# Patient Record
Sex: FEMALE | Race: WHITE | NOT HISPANIC OR LATINO | Employment: OTHER | URBAN - METROPOLITAN AREA
[De-identification: names, ages, dates, MRNs, and addresses within clinical notes are randomized per-mention and may not be internally consistent; named-entity substitution may affect disease eponyms.]

---

## 2019-04-15 RX ORDER — HYDROXYZINE HYDROCHLORIDE 10 MG/1
TABLET, FILM COATED ORAL
Qty: 90 TABLET | Refills: 0 | Status: SHIPPED | OUTPATIENT
Start: 2019-04-15

## 2019-06-24 ENCOUNTER — OFFICE VISIT (OUTPATIENT)
Dept: FAMILY MEDICINE | Facility: CLINIC | Age: 74
End: 2019-06-24
Payer: MEDICARE

## 2019-06-24 VITALS
OXYGEN SATURATION: 95 % | DIASTOLIC BLOOD PRESSURE: 82 MMHG | BODY MASS INDEX: 20.41 KG/M2 | TEMPERATURE: 97 F | RESPIRATION RATE: 18 BRPM | SYSTOLIC BLOOD PRESSURE: 136 MMHG | HEART RATE: 77 BPM | HEIGHT: 66 IN | WEIGHT: 127 LBS

## 2019-06-24 DIAGNOSIS — N39.46 MIXED STRESS AND URGE URINARY INCONTINENCE: Primary | ICD-10-CM

## 2019-06-24 DIAGNOSIS — Z86.73 HISTORY OF EMBOLIC STROKE: ICD-10-CM

## 2019-06-24 DIAGNOSIS — J30.2 SEASONAL ALLERGIES: ICD-10-CM

## 2019-06-24 DIAGNOSIS — H92.02 OTALGIA, LEFT: ICD-10-CM

## 2019-06-24 DIAGNOSIS — R51.9 CHRONIC INTRACTABLE HEADACHE, UNSPECIFIED HEADACHE TYPE: ICD-10-CM

## 2019-06-24 DIAGNOSIS — Z76.0 MEDICATION REFILL: ICD-10-CM

## 2019-06-24 DIAGNOSIS — E78.00 PURE HYPERCHOLESTEROLEMIA: ICD-10-CM

## 2019-06-24 DIAGNOSIS — I10 ESSENTIAL (PRIMARY) HYPERTENSION: ICD-10-CM

## 2019-06-24 DIAGNOSIS — G89.29 CHRONIC INTRACTABLE HEADACHE, UNSPECIFIED HEADACHE TYPE: ICD-10-CM

## 2019-06-24 PROBLEM — F32.A DEPRESSIVE DISORDER: Status: ACTIVE | Noted: 2017-11-06

## 2019-06-24 PROBLEM — M50.30 DDD (DEGENERATIVE DISC DISEASE), CERVICAL: Status: ACTIVE | Noted: 2018-09-06

## 2019-06-24 PROBLEM — D64.9 ANEMIA: Status: ACTIVE | Noted: 2018-09-06

## 2019-06-24 PROBLEM — J30.9 ALLERGIC RHINITIS: Status: ACTIVE | Noted: 2017-11-06

## 2019-06-24 PROCEDURE — 3075F PR MOST RECENT SYSTOLIC BLOOD PRESS GE 130-139MM HG: ICD-10-PCS | Mod: CPTII,S$GLB,, | Performed by: NURSE PRACTITIONER

## 2019-06-24 PROCEDURE — 3079F PR MOST RECENT DIASTOLIC BLOOD PRESSURE 80-89 MM HG: ICD-10-PCS | Mod: CPTII,S$GLB,, | Performed by: NURSE PRACTITIONER

## 2019-06-24 PROCEDURE — 99214 OFFICE O/P EST MOD 30 MIN: CPT | Mod: S$GLB,,, | Performed by: NURSE PRACTITIONER

## 2019-06-24 PROCEDURE — 99214 PR OFFICE/OUTPT VISIT, EST, LEVL IV, 30-39 MIN: ICD-10-PCS | Mod: S$GLB,,, | Performed by: NURSE PRACTITIONER

## 2019-06-24 PROCEDURE — 3075F SYST BP GE 130 - 139MM HG: CPT | Mod: CPTII,S$GLB,, | Performed by: NURSE PRACTITIONER

## 2019-06-24 PROCEDURE — 3079F DIAST BP 80-89 MM HG: CPT | Mod: CPTII,S$GLB,, | Performed by: NURSE PRACTITIONER

## 2019-06-24 RX ORDER — FLUTICASONE PROPIONATE 50 MCG
1 SPRAY, SUSPENSION (ML) NASAL DAILY PRN
Qty: 3 BOTTLE | Refills: 3 | Status: SHIPPED | OUTPATIENT
Start: 2019-06-24

## 2019-06-24 RX ORDER — POTASSIUM CHLORIDE 1500 MG/1
20 TABLET, EXTENDED RELEASE ORAL DAILY
Qty: 90 TABLET | Refills: 3 | Status: SHIPPED | OUTPATIENT
Start: 2019-06-24

## 2019-06-24 RX ORDER — AMITRIPTYLINE HYDROCHLORIDE 25 MG/1
25 TABLET, FILM COATED ORAL DAILY
Qty: 90 TABLET | Refills: 3 | Status: SHIPPED | OUTPATIENT
Start: 2019-06-24

## 2019-06-24 RX ORDER — CLOPIDOGREL BISULFATE 75 MG/1
75 TABLET ORAL DAILY
Qty: 90 TABLET | Refills: 3 | Status: SHIPPED | OUTPATIENT
Start: 2019-06-24

## 2019-06-24 RX ORDER — MONTELUKAST SODIUM 10 MG/1
1 TABLET ORAL DAILY
COMMUNITY
End: 2019-06-24 | Stop reason: SDUPTHER

## 2019-06-24 RX ORDER — MONTELUKAST SODIUM 10 MG/1
10 TABLET ORAL NIGHTLY
Qty: 90 TABLET | Refills: 3 | Status: SHIPPED | OUTPATIENT
Start: 2019-06-24

## 2019-06-24 RX ORDER — PRAVASTATIN SODIUM 40 MG/1
1 TABLET ORAL DAILY
COMMUNITY
End: 2019-06-24 | Stop reason: SDUPTHER

## 2019-06-24 RX ORDER — FESOTERODINE FUMARATE 8 MG/1
1 TABLET, EXTENDED RELEASE ORAL DAILY
COMMUNITY
End: 2019-07-01 | Stop reason: ALTCHOICE

## 2019-06-24 RX ORDER — CARVEDILOL 12.5 MG/1
12.5 TABLET ORAL 2 TIMES DAILY
Qty: 180 TABLET | Refills: 3 | Status: SHIPPED | OUTPATIENT
Start: 2019-06-24 | End: 2019-09-04

## 2019-06-24 RX ORDER — ASPIRIN 81 MG/1
1 TABLET ORAL DAILY
COMMUNITY

## 2019-06-24 RX ORDER — LORATADINE 10 MG/1
1 TABLET ORAL DAILY
COMMUNITY
End: 2019-06-24 | Stop reason: SDUPTHER

## 2019-06-24 RX ORDER — LEVOTHYROXINE SODIUM 100 UG/1
100 TABLET ORAL DAILY
Qty: 90 TABLET | Refills: 3 | Status: SHIPPED | OUTPATIENT
Start: 2019-06-24

## 2019-06-24 RX ORDER — LEVOTHYROXINE SODIUM 100 UG/1
1 TABLET ORAL DAILY
COMMUNITY
End: 2019-06-24 | Stop reason: SDUPTHER

## 2019-06-24 RX ORDER — CLOPIDOGREL BISULFATE 75 MG/1
1 TABLET ORAL DAILY
COMMUNITY
End: 2019-06-24 | Stop reason: SDUPTHER

## 2019-06-24 RX ORDER — ESCITALOPRAM OXALATE 20 MG/1
0.5 TABLET ORAL DAILY
COMMUNITY
End: 2019-06-24 | Stop reason: SDUPTHER

## 2019-06-24 RX ORDER — ACETAMINOPHEN 500 MG
1 TABLET ORAL DAILY
COMMUNITY

## 2019-06-24 RX ORDER — AMLODIPINE BESYLATE 5 MG/1
5 TABLET ORAL DAILY
Qty: 90 TABLET | Refills: 3 | Status: SHIPPED | OUTPATIENT
Start: 2019-06-24 | End: 2019-09-04

## 2019-06-24 RX ORDER — PRAVASTATIN SODIUM 40 MG/1
40 TABLET ORAL DAILY
Qty: 90 TABLET | Refills: 3 | Status: SHIPPED | OUTPATIENT
Start: 2019-06-24

## 2019-06-24 RX ORDER — FERROUS SULFATE 325(65) MG
1 TABLET ORAL EVERY 8 HOURS
COMMUNITY

## 2019-06-24 RX ORDER — FLUTICASONE PROPIONATE 50 MCG
1 SPRAY, SUSPENSION (ML) NASAL DAILY PRN
COMMUNITY
End: 2019-06-24 | Stop reason: SDUPTHER

## 2019-06-24 RX ORDER — FUROSEMIDE 20 MG/1
1 TABLET ORAL DAILY PRN
COMMUNITY
End: 2019-06-24 | Stop reason: SDUPTHER

## 2019-06-24 RX ORDER — ESCITALOPRAM OXALATE 20 MG/1
10 TABLET ORAL DAILY
Qty: 90 TABLET | Refills: 3 | Status: SHIPPED | OUTPATIENT
Start: 2019-06-24

## 2019-06-24 RX ORDER — LORATADINE 10 MG/1
10 TABLET ORAL DAILY
Qty: 90 TABLET | Refills: 3 | COMMUNITY
Start: 2019-06-24

## 2019-06-24 RX ORDER — TIZANIDINE 4 MG/1
1 TABLET ORAL DAILY
COMMUNITY
End: 2019-06-24 | Stop reason: SDUPTHER

## 2019-06-24 RX ORDER — AMLODIPINE BESYLATE 5 MG/1
1 TABLET ORAL DAILY
COMMUNITY
End: 2019-06-24 | Stop reason: SDUPTHER

## 2019-06-24 RX ORDER — POTASSIUM CHLORIDE 1500 MG/1
1 TABLET, EXTENDED RELEASE ORAL DAILY
COMMUNITY
End: 2019-06-24 | Stop reason: SDUPTHER

## 2019-06-24 RX ORDER — CARVEDILOL 12.5 MG/1
1 TABLET ORAL 2 TIMES DAILY
COMMUNITY
End: 2019-06-24 | Stop reason: SDUPTHER

## 2019-06-24 RX ORDER — QUINAPRIL HYDROCHLORIDE AND HYDROCHLOROTHIAZIDE 20; 25 MG/1; MG/1
1 TABLET, FILM COATED ORAL DAILY
COMMUNITY
End: 2019-06-24 | Stop reason: SDUPTHER

## 2019-06-24 RX ORDER — FUROSEMIDE 20 MG/1
20 TABLET ORAL DAILY PRN
Qty: 90 TABLET | Refills: 3 | Status: SHIPPED | OUTPATIENT
Start: 2019-06-24

## 2019-06-24 RX ORDER — LORAZEPAM 0.5 MG/1
0.5 TABLET ORAL NIGHTLY
Qty: 30 TABLET | Refills: 2 | Status: SHIPPED | OUTPATIENT
Start: 2019-06-24

## 2019-06-24 RX ORDER — LORAZEPAM 0.5 MG/1
1 TABLET ORAL 2 TIMES DAILY PRN
COMMUNITY
End: 2019-06-24 | Stop reason: SDUPTHER

## 2019-06-24 RX ORDER — AMITRIPTYLINE HYDROCHLORIDE 25 MG/1
1 TABLET, FILM COATED ORAL DAILY
COMMUNITY
End: 2019-06-24 | Stop reason: SDUPTHER

## 2019-06-24 RX ORDER — QUINAPRIL HYDROCHLORIDE AND HYDROCHLOROTHIAZIDE 20; 25 MG/1; MG/1
1 TABLET, FILM COATED ORAL DAILY
Qty: 90 TABLET | Refills: 3 | Status: SHIPPED | OUTPATIENT
Start: 2019-06-24 | End: 2019-09-04

## 2019-06-24 RX ORDER — TIZANIDINE 4 MG/1
4 TABLET ORAL DAILY
Qty: 90 TABLET | Refills: 3 | Status: SHIPPED | OUTPATIENT
Start: 2019-06-24

## 2019-06-24 NOTE — PROGRESS NOTES
"Subjective:       Patient ID: Marian Glass is a 73 y.o. female.    Chief Complaint: Urinary Incontinence (Urology referral. ); Sinus Problem ("head stopped up", some congestion "feels like it stays full".); Otalgia (C/o having a very sharp pain in her L ear, and L ear stays "stopped up"); and Medication Refill (All of her scripts were in [place] when she was there, needs new scripts since she is back in [place]. )    HPI   Hx of recurrent migraines and carotid artery stenosis s/p endarterectomy. Emolic stroke related to endarterectomy. Migraines stable now. When h/a occurs, she describes throbbing in temples that wraps around forehead. Equal on both sides. Occasionally sharp w/ movement. Very sensitive to light, sound , odor. Also c/o "seeing spots" with onset of headaches. No hx of seizures. No unilateral weakness.    Otalgia (C/o having a very sharp pain in her L ear, and L ear stays "stopped up")    Anxiety/Depression  Reported by patient.    Quality: symptoms worse in the evening  Severity: denies suicidal ideations; able to maintain relationships; does not interfere with activities of daily living   Duration: worse after hospitalization in May for stroke   Onset/Timing: still present   Context: health problems   Modifying Factors: social support; medications as directed   Associated Symptoms: denies homicidal ideations; no significant weight gain; no significant weight loss; no visual/auditory hallucinations; no delusions; no shortness of breath; no anxiety; no crying spells; no panic; no isolation; sleeping well; appetite good; no apathy; maintaining functionality; depression; headaches    Hypertension & Follow Up HTN  Reported by patient.    Onset/Timing: worse; chronic  Associated Symptoms: no dizziness; no lightheadedness; no chest pain; no shortness of breath; no palpitations; no edema; no calf pain with exertion; no vision change; headache  Lifestyle: limiting/avoiding salt; not exercising " regularly  Medications: no side effects from medication; not taking medications as directed; did not take this am      Review of Systems   Constitutional: Negative for activity change, appetite change, chills and fever.   HENT: Negative for congestion, hearing loss, nosebleeds, postnasal drip, sinus pressure, sore throat and trouble swallowing.    Respiratory: Negative for cough, chest tightness, shortness of breath and wheezing.    Cardiovascular: Negative for chest pain and palpitations.   Gastrointestinal: Negative for abdominal distention, abdominal pain, constipation, diarrhea, nausea and vomiting.   Genitourinary: Negative for difficulty urinating, flank pain, frequency, hematuria and urgency.   Musculoskeletal: Negative for arthralgias, back pain, joint swelling, neck pain and neck stiffness.   Skin: Negative for color change and pallor.   Neurological: Negative for dizziness, seizures, syncope, weakness, light-headedness and headaches.   Hematological: Negative for adenopathy. Does not bruise/bleed easily.   Psychiatric/Behavioral: Negative for agitation, behavioral problems, confusion and sleep disturbance. The patient is not nervous/anxious.        Objective:      Physical Exam   Constitutional: She is oriented to person, place, and time. She appears well-developed and well-nourished.   HENT:   Head: Normocephalic and atraumatic.   Mouth/Throat: Oropharynx is clear and moist.   Eyes: Pupils are equal, round, and reactive to light. Conjunctivae and EOM are normal. No scleral icterus.   Neck: Trachea normal and normal range of motion. Neck supple. Carotid bruit is not present. No thyroid mass present.   Cardiovascular: Normal rate, regular rhythm and normal heart sounds.   Pulmonary/Chest: Effort normal and breath sounds normal.   Abdominal: Soft. Bowel sounds are normal.   Musculoskeletal: Normal range of motion. She exhibits no edema.   Neurological: She is alert and oriented to person, place, and time.    Skin: Skin is warm and dry.   Psychiatric: She has a normal mood and affect. Her behavior is normal. Judgment normal.       Assessment:       1. Mixed stress and urge urinary incontinence    2. Chronic intractable headache, unspecified headache type    3. Seasonal allergies    4. Essential (primary) hypertension    5. Pure hypercholesterolemia    6. Otalgia, left    7. Medication refill    8. History of embolic stroke        Plan:       PROBLEM LIST     Marian was seen today for urinary incontinence, sinus problem, otalgia and medication refill.    Diagnoses and all orders for this visit:    Mixed stress and urge urinary incontinence    Chronic intractable headache, unspecified headache type    Seasonal allergies  Start Toviaz as directed  Essential (primary) hypertension  -     TSH w/reflex to FT4; Future  -     CBC auto differential; Future  -     Comprehensive metabolic panel; Future  -     Lipid panel; Future  -     Hemoglobin A1c; Future  -     TSH w/reflex to FT4  -     CBC auto differential  -     Comprehensive metabolic panel  -     Lipid panel  -     Hemoglobin A1c    Pure hypercholesterolemia  -     TSH w/reflex to FT4; Future  -     CBC auto differential; Future  -     Comprehensive metabolic panel; Future  -     Lipid panel; Future  -     Hemoglobin A1c; Future  -     TSH w/reflex to FT4  -     CBC auto differential  -     Comprehensive metabolic panel  -     Lipid panel  -     Hemoglobin A1c    Otalgia, left    Medication refill  -     amitriptyline (ELAVIL) 25 MG tablet; Take 1 tablet (25 mg total) by mouth once daily.  -     amLODIPine (NORVASC) 5 MG tablet; Take 1 tablet (5 mg total) by mouth once daily.  -     carvedilol (COREG) 12.5 MG tablet; Take 1 tablet (12.5 mg total) by mouth 2 (two) times daily.  -     clopidogrel (PLAVIX) 75 mg tablet; Take 1 tablet (75 mg total) by mouth once daily.  -     escitalopram oxalate (LEXAPRO) 20 MG tablet; Take 0.5 tablets (10 mg total) by mouth once daily.  -      fluticasone propionate (FLONASE ALLERGY RELIEF) 50 mcg/actuation nasal spray; 1 spray (50 mcg total) by Each Nare route daily as needed.  -     furosemide (LASIX) 20 MG tablet; Take 1 tablet (20 mg total) by mouth daily as needed.  -     tiZANidine (ZANAFLEX) 4 MG tablet; Take 1 tablet (4 mg total) by mouth once daily.  -     levothyroxine (SYNTHROID) 100 MCG tablet; Take 1 tablet (100 mcg total) by mouth once daily.  -     montelukast (SINGULAIR) 10 mg tablet; Take 1 tablet (10 mg total) by mouth every evening.  -     potassium chloride (K-TAB) 20 mEq; Take 1 tablet (20 mEq total) by mouth once daily.  -     pravastatin (PRAVACHOL) 40 MG tablet; Take 1 tablet (40 mg total) by mouth once daily.  -     quinapril-hydrochlorothiazide (ACCURETIC) 20-25 mg per tablet; Take 1 tablet by mouth once daily.  -     LORazepam (ATIVAN) 0.5 MG tablet; Take 1 tablet (0.5 mg total) by mouth every evening.    History of embolic stroke    Other orders  -     loratadine (CLARITIN) 10 mg tablet; Take 1 tablet (10 mg total) by mouth once daily.

## 2019-07-01 ENCOUNTER — OFFICE VISIT (OUTPATIENT)
Dept: FAMILY MEDICINE | Facility: CLINIC | Age: 74
End: 2019-07-01
Payer: MEDICARE

## 2019-07-01 VITALS
OXYGEN SATURATION: 94 % | RESPIRATION RATE: 18 BRPM | WEIGHT: 129.81 LBS | HEART RATE: 65 BPM | BODY MASS INDEX: 20.86 KG/M2 | TEMPERATURE: 98 F | HEIGHT: 66 IN | SYSTOLIC BLOOD PRESSURE: 146 MMHG | DIASTOLIC BLOOD PRESSURE: 78 MMHG

## 2019-07-01 DIAGNOSIS — E78.00 PURE HYPERCHOLESTEROLEMIA: ICD-10-CM

## 2019-07-01 DIAGNOSIS — N39.46 MIXED STRESS AND URGE URINARY INCONTINENCE: Primary | ICD-10-CM

## 2019-07-01 DIAGNOSIS — J30.2 SEASONAL ALLERGIES: ICD-10-CM

## 2019-07-01 DIAGNOSIS — R51.9 CHRONIC INTRACTABLE HEADACHE, UNSPECIFIED HEADACHE TYPE: ICD-10-CM

## 2019-07-01 DIAGNOSIS — G89.29 CHRONIC INTRACTABLE HEADACHE, UNSPECIFIED HEADACHE TYPE: ICD-10-CM

## 2019-07-01 DIAGNOSIS — I10 ESSENTIAL (PRIMARY) HYPERTENSION: ICD-10-CM

## 2019-07-01 PROCEDURE — 99213 PR OFFICE/OUTPT VISIT, EST, LEVL III, 20-29 MIN: ICD-10-PCS | Mod: S$GLB,,, | Performed by: NURSE PRACTITIONER

## 2019-07-01 PROCEDURE — 3078F DIAST BP <80 MM HG: CPT | Mod: CPTII,S$GLB,, | Performed by: NURSE PRACTITIONER

## 2019-07-01 PROCEDURE — 3077F SYST BP >= 140 MM HG: CPT | Mod: CPTII,S$GLB,, | Performed by: NURSE PRACTITIONER

## 2019-07-01 PROCEDURE — 99213 OFFICE O/P EST LOW 20 MIN: CPT | Mod: S$GLB,,, | Performed by: NURSE PRACTITIONER

## 2019-07-01 PROCEDURE — 3077F PR MOST RECENT SYSTOLIC BLOOD PRESSURE >= 140 MM HG: ICD-10-PCS | Mod: CPTII,S$GLB,, | Performed by: NURSE PRACTITIONER

## 2019-07-01 PROCEDURE — 3078F PR MOST RECENT DIASTOLIC BLOOD PRESSURE < 80 MM HG: ICD-10-PCS | Mod: CPTII,S$GLB,, | Performed by: NURSE PRACTITIONER

## 2019-07-01 RX ORDER — FESOTERODINE FUMARATE 8 MG/1
TABLET, EXTENDED RELEASE ORAL
COMMUNITY
End: 2019-07-01 | Stop reason: ALTCHOICE

## 2019-07-01 NOTE — PROGRESS NOTES
"Subjective:       Patient ID: Marian Glass is a 73 y.o. female.    Chief Complaint: incontinence      HPI   Hx of recurrent migraines and carotid artery stenosis s/p endarterectomy. Emolic stroke related to endarterectomy. Migraines stable now. When h/a occurs, she describes throbbing in temples that wraps around forehead. Equal on both sides. Occasionally sharp w/ movement. Very sensitive to light, sound , odor. Also c/o "seeing spots" with onset of headaches. No hx of seizures. No unilateral weakness.    Otalgia (C/o having a very sharp pain in her L ear, and L ear stays "stopped up")    Anxiety/Depression  Reported by patient.    Quality: symptoms worse in the evening  Severity: denies suicidal ideations; able to maintain relationships; does not interfere with activities of daily living   Duration: worse after hospitalization in May for stroke   Onset/Timing: still present   Context: health problems   Modifying Factors: social support; medications as directed   Associated Symptoms: denies homicidal ideations; no significant weight gain; no significant weight loss; no visual/auditory hallucinations; no delusions; no shortness of breath; no anxiety; no crying spells; no panic; no isolation; sleeping well; appetite good; no apathy; maintaining functionality; depression; headaches    Hypertension & Follow Up HTN  Reported by patient.    Onset/Timing: worse; chronic  Associated Symptoms: no dizziness; no lightheadedness; no chest pain; no shortness of breath; no palpitations; no edema; no calf pain with exertion; no vision change; headache  Lifestyle: limiting/avoiding salt; not exercising regularly  Medications: no side effects from medication; not taking medications as directed; did not take this am      Review of Systems   Constitutional: Negative for activity change, appetite change, chills and fever.   HENT: Negative for congestion, hearing loss, nosebleeds, postnasal drip, sinus pressure, sore throat and trouble " swallowing.    Respiratory: Negative for cough, chest tightness, shortness of breath and wheezing.    Cardiovascular: Negative for chest pain and palpitations.   Gastrointestinal: Negative for abdominal distention, abdominal pain, constipation, diarrhea, nausea and vomiting.   Genitourinary: Negative for difficulty urinating, flank pain, frequency, hematuria and urgency.   Musculoskeletal: Negative for arthralgias, back pain, joint swelling, neck pain and neck stiffness.   Skin: Negative for color change and pallor.   Neurological: Negative for dizziness, seizures, syncope, weakness, light-headedness and headaches.   Hematological: Negative for adenopathy. Does not bruise/bleed easily.   Psychiatric/Behavioral: Negative for agitation, behavioral problems, confusion and sleep disturbance. The patient is not nervous/anxious.        Objective:      Physical Exam   Constitutional: She is oriented to person, place, and time. She appears well-developed and well-nourished.   HENT:   Head: Normocephalic and atraumatic.   Mouth/Throat: Oropharynx is clear and moist.   Eyes: Pupils are equal, round, and reactive to light. Conjunctivae and EOM are normal. No scleral icterus.   Neck: Trachea normal and normal range of motion. Neck supple. Carotid bruit is not present. No thyroid mass present.   Cardiovascular: Normal rate, regular rhythm and normal heart sounds.   Pulmonary/Chest: Effort normal and breath sounds normal.   Abdominal: Soft. Bowel sounds are normal.   Musculoskeletal: Normal range of motion. She exhibits no edema.   Neurological: She is alert and oriented to person, place, and time.   Skin: Skin is warm and dry.   Psychiatric: She has a normal mood and affect. Her behavior is normal. Judgment normal.       Assessment:       1. Mixed stress and urge urinary incontinence    2. Chronic intractable headache, unspecified headache type    3. Seasonal allergies    4. Essential (primary) hypertension    5. Pure  hypercholesterolemia        Plan:       PROBLEM LIST     Marian was seen today for urinary incontinence, sinus problem, otalgia and medication refill.    Diagnoses and all orders for this visit:    Mixed stress and urge urinary incontinence  Change Toviaz to Myrebetriq     Chronic intractable headache, unspecified headache type    Seasonal allergies      Essential (primary) hypertension      Pure hypercholesterolemia    Otalgia, left    History of embolic stroke

## 2019-08-07 DIAGNOSIS — N39.46 MIXED STRESS AND URGE URINARY INCONTINENCE: Primary | ICD-10-CM

## 2019-09-04 ENCOUNTER — OFFICE VISIT (OUTPATIENT)
Dept: FAMILY MEDICINE | Facility: CLINIC | Age: 74
End: 2019-09-04
Payer: MEDICARE

## 2019-09-04 VITALS
DIASTOLIC BLOOD PRESSURE: 96 MMHG | HEIGHT: 66 IN | TEMPERATURE: 97 F | SYSTOLIC BLOOD PRESSURE: 140 MMHG | BODY MASS INDEX: 20.63 KG/M2 | RESPIRATION RATE: 16 BRPM | WEIGHT: 128.38 LBS | HEART RATE: 85 BPM | OXYGEN SATURATION: 92 %

## 2019-09-04 DIAGNOSIS — N32.81 OVERACTIVE BLADDER: ICD-10-CM

## 2019-09-04 DIAGNOSIS — N39.46 MIXED STRESS AND URGE URINARY INCONTINENCE: ICD-10-CM

## 2019-09-04 DIAGNOSIS — I10 ESSENTIAL HYPERTENSION: Primary | ICD-10-CM

## 2019-09-04 PROCEDURE — 99213 PR OFFICE/OUTPT VISIT, EST, LEVL III, 20-29 MIN: ICD-10-PCS | Mod: S$GLB,,, | Performed by: NURSE PRACTITIONER

## 2019-09-04 PROCEDURE — 3077F SYST BP >= 140 MM HG: CPT | Mod: CPTII,S$GLB,, | Performed by: NURSE PRACTITIONER

## 2019-09-04 PROCEDURE — 3080F DIAST BP >= 90 MM HG: CPT | Mod: CPTII,S$GLB,, | Performed by: NURSE PRACTITIONER

## 2019-09-04 PROCEDURE — 99213 OFFICE O/P EST LOW 20 MIN: CPT | Mod: S$GLB,,, | Performed by: NURSE PRACTITIONER

## 2019-09-04 PROCEDURE — 3077F PR MOST RECENT SYSTOLIC BLOOD PRESSURE >= 140 MM HG: ICD-10-PCS | Mod: CPTII,S$GLB,, | Performed by: NURSE PRACTITIONER

## 2019-09-04 PROCEDURE — 3080F PR MOST RECENT DIASTOLIC BLOOD PRESSURE >= 90 MM HG: ICD-10-PCS | Mod: CPTII,S$GLB,, | Performed by: NURSE PRACTITIONER

## 2019-09-04 RX ORDER — CARVEDILOL 6.25 MG/1
6.25 TABLET ORAL EVERY MORNING
Qty: 90 TABLET | Refills: 3
Start: 2019-09-04 | End: 2020-09-03

## 2019-09-04 RX ORDER — AMLODIPINE BESYLATE 5 MG/1
5 TABLET ORAL NIGHTLY
Qty: 90 TABLET | Refills: 3
Start: 2019-09-04

## 2019-09-04 RX ORDER — QUINAPRIL HYDROCHLORIDE AND HYDROCHLOROTHIAZIDE 20; 25 MG/1; MG/1
1 TABLET, FILM COATED ORAL EVERY MORNING
Qty: 90 TABLET | Refills: 3
Start: 2019-09-04

## 2019-09-04 NOTE — PROGRESS NOTES
"Subjective:       Patient ID: Marian Glass is a 74 y.o. female.    Chief Complaint: Follow-up (blood pressure keeps getting real low then real high. "jumps around")    HPI   blood pressure keeps getting real low then real high. "jumps around". Takes BP multiple times daily. Drops as low as 70/50, but will sometimes increase to 150/90. Sometimes feels dizzy when BP drops low. No syncopal events.     Review of Systems   Constitutional: Negative for activity change, appetite change, chills and fever.   HENT: Negative for congestion, hearing loss, nosebleeds, postnasal drip, sinus pressure, sore throat and trouble swallowing.    Respiratory: Negative for cough, chest tightness, shortness of breath and wheezing.    Cardiovascular: Negative for chest pain and palpitations.   Gastrointestinal: Negative for abdominal distention, abdominal pain, constipation, diarrhea, nausea and vomiting.   Genitourinary: Negative for difficulty urinating, flank pain, frequency, hematuria and urgency.   Musculoskeletal: Negative for arthralgias, back pain, joint swelling, neck pain and neck stiffness.   Skin: Negative for color change and pallor.   Neurological: Positive for dizziness. Negative for seizures, syncope, weakness, light-headedness and headaches.   Hematological: Negative for adenopathy. Does not bruise/bleed easily.   Psychiatric/Behavioral: Negative for agitation, behavioral problems, confusion and sleep disturbance. The patient is not nervous/anxious.        Objective:      Physical Exam   Constitutional: She is oriented to person, place, and time. She appears well-developed and well-nourished.   HENT:   Head: Normocephalic and atraumatic.   Mouth/Throat: Oropharynx is clear and moist.   Eyes: Pupils are equal, round, and reactive to light. Conjunctivae and EOM are normal. No scleral icterus.   Neck: Trachea normal and normal range of motion. Neck supple. Carotid bruit is not present. No thyroid mass present.   Cardiovascular: " Normal rate, regular rhythm and normal heart sounds.   Pulmonary/Chest: Effort normal and breath sounds normal.   Abdominal: Soft. Bowel sounds are normal.   Musculoskeletal: Normal range of motion. She exhibits no edema.   Neurological: She is alert and oriented to person, place, and time.   Skin: Skin is warm and dry.   Psychiatric: She has a normal mood and affect. Her behavior is normal. Judgment normal.       Assessment:       1. Essential hypertension    2. Overactive bladder    3. Mixed stress and urge urinary incontinence        Plan:       PROBLEM LIST     Marian was seen today for follow-up.    Diagnoses and all orders for this visit:    Essential hypertension  -     carvedilol (COREG) 6.25 MG tablet; Take 1 tablet (6.25 mg total) by mouth every morning.  -     quinapril-hydrochlorothiazide (ACCURETIC) 20-25 mg per tablet; Take 1 tablet by mouth every morning.  -     amLODIPine (NORVASC) 5 MG tablet; Take 1 tablet (5 mg total) by mouth nightly.    Overactive bladder  -     mirabegron (MYRBETRIQ) 50 mg Tb24; Take 1 tablet (50 mg total) by mouth nightly.    Mixed stress and urge urinary incontinence    Reducing Coreg to 6.25 daily, take in the morning w/ quinapril-HCTZ. Reserve amlodipine for bedtime w/ Myrbetriq. Stop using wrist BP cuff and use BP cuff that can be pulled up the arm. RTC in 2 weeks w/ BP log.

## 2019-09-18 ENCOUNTER — OFFICE VISIT (OUTPATIENT)
Dept: FAMILY MEDICINE | Facility: CLINIC | Age: 74
End: 2019-09-18
Payer: MEDICARE

## 2019-09-18 VITALS
TEMPERATURE: 97 F | WEIGHT: 128.38 LBS | BODY MASS INDEX: 20.63 KG/M2 | RESPIRATION RATE: 16 BRPM | HEIGHT: 66 IN | SYSTOLIC BLOOD PRESSURE: 132 MMHG | DIASTOLIC BLOOD PRESSURE: 78 MMHG

## 2019-09-18 DIAGNOSIS — Z23 FLU VACCINE NEED: ICD-10-CM

## 2019-09-18 DIAGNOSIS — N39.46 MIXED STRESS AND URGE URINARY INCONTINENCE: ICD-10-CM

## 2019-09-18 DIAGNOSIS — R09.89 LABILE BLOOD PRESSURE: Primary | ICD-10-CM

## 2019-09-18 DIAGNOSIS — I10 ESSENTIAL HYPERTENSION: ICD-10-CM

## 2019-09-18 DIAGNOSIS — R05.9 COUGH: ICD-10-CM

## 2019-09-18 DIAGNOSIS — N32.81 OVERACTIVE BLADDER: ICD-10-CM

## 2019-09-18 PROCEDURE — 90686 PR FLU VACCINE, QIIV4, NO PRSV, 0.5 ML, IM: ICD-10-PCS | Mod: S$GLB,ICN,, | Performed by: NURSE PRACTITIONER

## 2019-09-18 PROCEDURE — 3075F PR MOST RECENT SYSTOLIC BLOOD PRESS GE 130-139MM HG: ICD-10-PCS | Mod: CPTII,S$GLB,ICN, | Performed by: NURSE PRACTITIONER

## 2019-09-18 PROCEDURE — G0008 ADMIN INFLUENZA VIRUS VAC: HCPCS | Mod: S$GLB,ICN,, | Performed by: NURSE PRACTITIONER

## 2019-09-18 PROCEDURE — G0008 PR ADMIN INFLUENZA VIRUS VAC: ICD-10-PCS | Mod: S$GLB,ICN,, | Performed by: NURSE PRACTITIONER

## 2019-09-18 PROCEDURE — 99213 PR OFFICE/OUTPT VISIT, EST, LEVL III, 20-29 MIN: ICD-10-PCS | Mod: 25,S$GLB,ICN, | Performed by: NURSE PRACTITIONER

## 2019-09-18 PROCEDURE — 3078F DIAST BP <80 MM HG: CPT | Mod: CPTII,S$GLB,ICN, | Performed by: NURSE PRACTITIONER

## 2019-09-18 PROCEDURE — 3075F SYST BP GE 130 - 139MM HG: CPT | Mod: CPTII,S$GLB,ICN, | Performed by: NURSE PRACTITIONER

## 2019-09-18 PROCEDURE — 99213 OFFICE O/P EST LOW 20 MIN: CPT | Mod: 25,S$GLB,ICN, | Performed by: NURSE PRACTITIONER

## 2019-09-18 PROCEDURE — 90686 IIV4 VACC NO PRSV 0.5 ML IM: CPT | Mod: S$GLB,ICN,, | Performed by: NURSE PRACTITIONER

## 2019-09-18 PROCEDURE — 3078F PR MOST RECENT DIASTOLIC BLOOD PRESSURE < 80 MM HG: ICD-10-PCS | Mod: CPTII,S$GLB,ICN, | Performed by: NURSE PRACTITIONER

## 2019-09-18 RX ORDER — BENZONATATE 200 MG/1
200 CAPSULE ORAL 3 TIMES DAILY PRN
Qty: 30 CAPSULE | Refills: 3 | Status: SHIPPED | OUTPATIENT
Start: 2019-09-18 | End: 2019-09-28

## 2019-09-18 NOTE — PROGRESS NOTES
Subjective:       Patient ID: Marian Glass is a 74 y.o. female.    Chief Complaint: Follow-up (medication change) and Cough (still having a cough is not getting better. wondering if she needs meds)    HPI   Came in for labile BP on 9/4/19. Her BP was dropping to 70/50, but also increases to 160/100. Was complaining of dizziness when BP drops low. No syncopal events. At our last appt, we reduced Coreg to 6.25 daily and instructed her to take in the morning w/ quinapril-HCTZ. Reserve amlodipine for bedtime w/ Myrbetriq.  She brought in her new BP log and her BP has stabilized. Now trending 120-130/70. No longer dizzy.     Review of Systems   Constitutional: Negative for activity change, appetite change, chills and fever.   HENT: Negative for congestion, hearing loss, nosebleeds, postnasal drip, sinus pressure, sore throat and trouble swallowing.    Respiratory: Negative for cough, chest tightness, shortness of breath and wheezing.    Cardiovascular: Negative for chest pain and palpitations.   Gastrointestinal: Negative for abdominal distention, abdominal pain, constipation, diarrhea, nausea and vomiting.   Genitourinary: Negative for difficulty urinating, flank pain, frequency, hematuria and urgency.   Musculoskeletal: Negative for arthralgias, back pain, joint swelling, neck pain and neck stiffness.   Skin: Negative for color change and pallor.   Neurological: Negative for dizziness, seizures, syncope, weakness, light-headedness and headaches.   Hematological: Negative for adenopathy. Does not bruise/bleed easily.   Psychiatric/Behavioral: Negative for agitation, behavioral problems, confusion and sleep disturbance. The patient is not nervous/anxious.        Objective:      Physical Exam   Constitutional: She is oriented to person, place, and time. She appears well-developed and well-nourished.   HENT:   Head: Normocephalic and atraumatic.   Mouth/Throat: Oropharynx is clear and moist.   Eyes: Pupils are equal, round,  and reactive to light. Conjunctivae and EOM are normal. No scleral icterus.   Neck: Trachea normal and normal range of motion. Neck supple. Carotid bruit is not present. No thyroid mass present.   Cardiovascular: Normal rate, regular rhythm and normal heart sounds.   Pulmonary/Chest: Effort normal and breath sounds normal.   Abdominal: Soft. Bowel sounds are normal.   Musculoskeletal: Normal range of motion. She exhibits no edema.   Neurological: She is alert and oriented to person, place, and time.   Skin: Skin is warm and dry.   Psychiatric: She has a normal mood and affect. Her behavior is normal. Judgment normal.       Assessment:       1. Labile blood pressure    2. Essential hypertension    3. Overactive bladder    4. Mixed stress and urge urinary incontinence        Plan:       PROBLEM LIST     Marian was seen today for follow-up and cough.    Diagnoses and all orders for this visit:    Labile blood pressure    Essential hypertension    Overactive bladder    Mixed stress and urge urinary incontinence    BP now longer labile. Continue current regimen. Will provide w/ flu vaccine today.

## 2019-09-30 ENCOUNTER — PROCEDURE VISIT (OUTPATIENT)
Dept: FAMILY MEDICINE | Facility: CLINIC | Age: 74
End: 2019-09-30
Payer: MEDICARE

## 2019-09-30 VITALS
HEART RATE: 88 BPM | BODY MASS INDEX: 20.28 KG/M2 | RESPIRATION RATE: 16 BRPM | WEIGHT: 126.19 LBS | TEMPERATURE: 98 F | SYSTOLIC BLOOD PRESSURE: 132 MMHG | HEIGHT: 66 IN | DIASTOLIC BLOOD PRESSURE: 56 MMHG | OXYGEN SATURATION: 98 %

## 2019-09-30 DIAGNOSIS — S01.01XA LACERATION OF SCALP WITHOUT FOREIGN BODY, INITIAL ENCOUNTER: Primary | ICD-10-CM

## 2019-09-30 DIAGNOSIS — Z48.02 VISIT FOR SUTURE REMOVAL: Primary | ICD-10-CM

## 2019-09-30 PROCEDURE — 99024 POSTOP FOLLOW-UP VISIT: CPT | Mod: S$GLB,,, | Performed by: NURSE PRACTITIONER

## 2019-09-30 PROCEDURE — 99024 SUTURE REMOVAL: ICD-10-PCS | Mod: S$GLB,,, | Performed by: NURSE PRACTITIONER

## 2019-09-30 RX ORDER — BACITRACIN ZINC 500 UNIT/G
OINTMENT (GRAM) TOPICAL 2 TIMES DAILY
Refills: 0 | COMMUNITY
Start: 2019-09-30 | End: 2019-10-02 | Stop reason: SDUPTHER

## 2019-09-30 NOTE — PROCEDURES
Suture Removal  Date/Time: 9/30/2019 9:00 AM  Performed by: Zainab Freeman NP  Authorized by: Zainab Freeman NP

## 2019-10-02 DIAGNOSIS — S01.01XA LACERATION OF SCALP WITHOUT FOREIGN BODY, INITIAL ENCOUNTER: ICD-10-CM

## 2019-10-02 RX ORDER — BACITRACIN ZINC 500 UNIT/G
OINTMENT (GRAM) TOPICAL 2 TIMES DAILY
Refills: 0 | COMMUNITY
Start: 2019-10-02

## 2019-10-03 ENCOUNTER — OFFICE VISIT (OUTPATIENT)
Dept: SURGERY | Facility: CLINIC | Age: 74
End: 2019-10-03
Payer: MEDICARE

## 2019-10-03 ENCOUNTER — PROCEDURE VISIT (OUTPATIENT)
Dept: FAMILY MEDICINE | Facility: CLINIC | Age: 74
End: 2019-10-03
Payer: MEDICARE

## 2019-10-03 VITALS
WEIGHT: 126.19 LBS | SYSTOLIC BLOOD PRESSURE: 118 MMHG | OXYGEN SATURATION: 96 % | BODY MASS INDEX: 20.28 KG/M2 | HEART RATE: 78 BPM | RESPIRATION RATE: 16 BRPM | DIASTOLIC BLOOD PRESSURE: 58 MMHG | TEMPERATURE: 97 F | HEIGHT: 66 IN

## 2019-10-03 VITALS
WEIGHT: 128 LBS | RESPIRATION RATE: 16 BRPM | HEIGHT: 68 IN | BODY MASS INDEX: 19.4 KG/M2 | SYSTOLIC BLOOD PRESSURE: 132 MMHG | DIASTOLIC BLOOD PRESSURE: 78 MMHG

## 2019-10-03 DIAGNOSIS — S01.00XA OPEN WOUND OF SCALP, INITIAL ENCOUNTER: Primary | ICD-10-CM

## 2019-10-03 DIAGNOSIS — S01.01XD LACERATION OF SCALP, SUBSEQUENT ENCOUNTER: ICD-10-CM

## 2019-10-03 DIAGNOSIS — G44.319 ACUTE POST-TRAUMATIC HEADACHE, NOT INTRACTABLE: ICD-10-CM

## 2019-10-03 DIAGNOSIS — Z48.02 REMOVAL OF STAPLE: ICD-10-CM

## 2019-10-03 DIAGNOSIS — S01.01XD LACERATION OF SCALP, SUBSEQUENT ENCOUNTER: Primary | ICD-10-CM

## 2019-10-03 DIAGNOSIS — T81.31XD DISRUPTION OF EXTERNAL SURGICAL WOUND, SUBSEQUENT ENCOUNTER: Primary | ICD-10-CM

## 2019-10-03 DIAGNOSIS — T81.31XD DISRUPTION OF EXTERNAL SURGICAL WOUND, SUBSEQUENT ENCOUNTER: ICD-10-CM

## 2019-10-03 PROCEDURE — A4550 PR SURGICAL TRAYS: ICD-10-PCS | Mod: S$GLB,,, | Performed by: REGISTERED NURSE

## 2019-10-03 PROCEDURE — 99213 PR OFFICE/OUTPT VISIT, EST, LEVL III, 20-29 MIN: ICD-10-PCS | Mod: S$GLB,,, | Performed by: SURGERY

## 2019-10-03 PROCEDURE — 99024 SUTURE REMOVAL: ICD-10-PCS | Mod: S$GLB,,, | Performed by: NURSE PRACTITIONER

## 2019-10-03 PROCEDURE — 99024 POSTOP FOLLOW-UP VISIT: CPT | Mod: S$GLB,,, | Performed by: NURSE PRACTITIONER

## 2019-10-03 PROCEDURE — 3078F PR MOST RECENT DIASTOLIC BLOOD PRESSURE < 80 MM HG: ICD-10-PCS | Mod: CPTII,S$GLB,, | Performed by: SURGERY

## 2019-10-03 PROCEDURE — 3075F SYST BP GE 130 - 139MM HG: CPT | Mod: CPTII,S$GLB,, | Performed by: SURGERY

## 2019-10-03 PROCEDURE — 3075F PR MOST RECENT SYSTOLIC BLOOD PRESS GE 130-139MM HG: ICD-10-PCS | Mod: CPTII,S$GLB,, | Performed by: SURGERY

## 2019-10-03 PROCEDURE — 99213 OFFICE O/P EST LOW 20 MIN: CPT | Mod: S$GLB,,, | Performed by: SURGERY

## 2019-10-03 PROCEDURE — A4550 SURGICAL TRAYS: HCPCS | Mod: S$GLB,,, | Performed by: REGISTERED NURSE

## 2019-10-03 PROCEDURE — 97597 DBRDMT OPN WND 1ST 20 CM/<: CPT | Mod: S$GLB,,, | Performed by: REGISTERED NURSE

## 2019-10-03 PROCEDURE — 3078F DIAST BP <80 MM HG: CPT | Mod: CPTII,S$GLB,, | Performed by: SURGERY

## 2019-10-03 PROCEDURE — 97597 DEBRIDEMENT: ICD-10-PCS | Mod: S$GLB,,, | Performed by: REGISTERED NURSE

## 2019-10-03 RX ORDER — TRAMADOL HYDROCHLORIDE 50 MG/1
50 TABLET ORAL EVERY 6 HOURS PRN
Qty: 28 TABLET | Refills: 0 | Status: SHIPPED | OUTPATIENT
Start: 2019-10-03

## 2019-10-03 NOTE — PROCEDURES
Suture Removal  Date/Time: 10/3/2019 8:30 AM  Performed by: Zainab Freeman NP  Authorized by: Zainab Freeman NP        Large wound posterior scalp from fall 2 wk ago; Staples placed at [facility] ED. She returns for removal. complete dehiscence; was able to remove 2 staples w/ 1 retained. Large amount of slough around wound edges that will need to be debrided. Consulting gen surgery

## 2019-10-07 ENCOUNTER — OFFICE VISIT (OUTPATIENT)
Dept: SURGERY | Facility: CLINIC | Age: 74
End: 2019-10-07
Payer: MEDICARE

## 2019-10-07 VITALS — WEIGHT: 128 LBS | BODY MASS INDEX: 19.4 KG/M2 | HEIGHT: 68 IN | RESPIRATION RATE: 16 BRPM

## 2019-10-07 DIAGNOSIS — S01.00XD OPEN WOUND OF SCALP, UNSPECIFIED OPEN WOUND TYPE, SUBSEQUENT ENCOUNTER: Primary | ICD-10-CM

## 2019-10-07 PROCEDURE — 99213 PR OFFICE/OUTPT VISIT, EST, LEVL III, 20-29 MIN: ICD-10-PCS | Mod: S$GLB,,, | Performed by: REGISTERED NURSE

## 2019-10-07 PROCEDURE — 99213 OFFICE O/P EST LOW 20 MIN: CPT | Mod: S$GLB,,, | Performed by: REGISTERED NURSE

## 2019-10-08 NOTE — PROGRESS NOTES
Subjective:       Patient ID: Marian Glass is a 74 y.o. female.    Chief Complaint: Head Laceration    74-year-old female who was referred to our office by Zainab Freeman NP secondary to an open wound on the back of patient's scalp.  Patient states that she fell 2 weeks ago hitting the back of her head.  She presented to like to also more all emergency room where she was evaluated and she had staples placed. Between the time of her emergency room visit and her follow-up with her primary care the wound dehisced.  When she followed up with her primary care today some of the staples were removed but she was unable to get all of the staples out 1 was retained.  She was referred to our office for wound evaluation and debridement.  Patient denies any pain. She denies any headaches, fevers, chills.    Review of Systems   Constitutional: Negative for chills, diaphoresis, fatigue and fever.   HENT: Negative for congestion and rhinorrhea.    Eyes: Negative for discharge, redness and itching.   Respiratory: Negative for apnea, chest tightness and shortness of breath.    Cardiovascular: Negative for chest pain.   Gastrointestinal: Negative for abdominal distention.   Genitourinary: Negative for difficulty urinating and dysuria.   Musculoskeletal: Negative for arthralgias and gait problem.   Skin: Positive for wound. Negative for color change, pallor and rash.        Wound to posterior scalp.   Neurological: Negative for dizziness, syncope, weakness, light-headedness and headaches.   Psychiatric/Behavioral: Negative for behavioral problems.       Objective:      Physical Exam   Constitutional: She is oriented to person, place, and time. She appears well-developed and well-nourished.   HENT:   Head: Normocephalic.   Eyes: Conjunctivae and EOM are normal.   Neck: Normal range of motion.   Cardiovascular: Normal rate and regular rhythm.   Pulmonary/Chest: Effort normal and breath sounds normal.   Abdominal: Soft. Bowel sounds are  "normal.   Musculoskeletal: Normal range of motion.   Neurological: She is alert and oriented to person, place, and time.   Skin: Skin is warm and dry. Capillary refill takes less than 2 seconds.        Open wound to posterior scalp over the parietal bone.  Wound measures approximately 3 cm in diameter and is circular in shape.  Wound is approximately 1 cm deep.  There is mild undermining in the 11 o'clock position of the wound. There is slough around the edges of the wound from the 9 to 4 o'clock positions.  There is 1 staple retained at at the 6 o'clock position. Wound bed is a mixture of slough and beefy red.  No current drainage or foul odor.  No signs of infection.   Psychiatric: She has a normal mood and affect.   Vitals reviewed.      Assessment:       No diagnosis found.    Plan:        1.  Debridement of wound today in clinic.  2.  Referred to outpatient wound care for continued wound treatment.  3.  Follow-up in office in 2 weeks for wound evaluation.      Debridement  Date/Time: 10/3/2019 9:10 AM  Performed by: Bret Masters NP  Authorized by: Bret Masters NP     Time out: Immediately prior to procedure a "time out" was called to verify the correct patient, procedure, equipment, support staff and site/side marked as required.    Consent Done?:  Yes (Verbal)    Preparation: Patient was prepped and draped in usual sterile fashion    Local anesthesia used?: Yes    Anesthesia:  Local infiltration  Local anesthetic:  Bupivacaine 0.25% without epinephrine and lidocaine 1% without epinephrine    Wound Details:    Location:  Head (Posterior scalp over the parietal bone)       Length (cm):  3       Area (sq cm):  9       Width (cm):  3       Percent Debrided (%):  20       Depth (cm):  1       Total Area Debrided (sq cm):  1.8    Depth of debridement:  Epidermis/Dermis    Tissue debrided:  Other, Epidermis and Subcutaneous (Non viable tissue)    Devitalized tissue debrided:  Slough    Instruments:  " Blade    Bleeding:  Minimal  Hemostasis Achieved: Yes    Method Used:  Pressure  Patient tolerance:  Patient tolerated the procedure well with no immediate complications

## 2019-10-08 NOTE — PATIENT INSTRUCTIONS
Wound care was contacted and nurse states they will make sure patient get scheduled as soon as possible for evaluation and treatment.

## 2019-10-08 NOTE — PROGRESS NOTES
74-year-old female who was last seen on 10/03/2019 secondary to open wound to the back of her scalp.  On that visit the wound was debrided and wet to dry dressing was placed. She was referred to outpatient wound care.  She was scheduled for a follow-up visit today for wound evaluation in case patient was not able to get to wound care on 10/04/2019.  She returns today stating that she missed her phone call on 10/04/2019 to get scheduled with wound care.  Daughter states that she is going to contact wound care on the number left on the message to scheduled visit.  Patient and family deny any complications. Daughter states that she has changed the dressing twice over the weekend.    Dressing was removed. Wound is still the same size from last visit and wound bed is still beefy red in wound edges are still nice and pink.  No drainage noted.  No foul smell noted. No signs of infection.    New wet to dry dressing was placed and head wrap placed to hold on dressing. We discussed patient needing to get into wound care for further treatment.  Daughter states that she will contact wound care today.  I inform family that I will also contact wound care to make sure she gets scheduled.  All the patient and family's questions were answered to their satisfaction.  They stated understanding of everything discussed on today's visit.  We will see them back in office in 2 weeks for wound evaluation.

## 2019-10-21 ENCOUNTER — OFFICE VISIT (OUTPATIENT)
Dept: FAMILY MEDICINE | Facility: CLINIC | Age: 74
End: 2019-10-21
Payer: MEDICARE

## 2019-10-21 ENCOUNTER — OFFICE VISIT (OUTPATIENT)
Dept: SURGERY | Facility: CLINIC | Age: 74
End: 2019-10-21
Payer: MEDICARE

## 2019-10-21 VITALS
HEART RATE: 120 BPM | RESPIRATION RATE: 16 BRPM | DIASTOLIC BLOOD PRESSURE: 71 MMHG | HEIGHT: 68 IN | WEIGHT: 128 LBS | BODY MASS INDEX: 19.4 KG/M2 | SYSTOLIC BLOOD PRESSURE: 104 MMHG

## 2019-10-21 VITALS
TEMPERATURE: 97 F | WEIGHT: 124.5 LBS | BODY MASS INDEX: 18.87 KG/M2 | DIASTOLIC BLOOD PRESSURE: 66 MMHG | HEIGHT: 68 IN | SYSTOLIC BLOOD PRESSURE: 94 MMHG | HEART RATE: 66 BPM

## 2019-10-21 DIAGNOSIS — B02.29 HERPES ZOSTER VIRUS INFECTION OF FACE AND EAR NERVES: Primary | ICD-10-CM

## 2019-10-21 DIAGNOSIS — T81.31XD DISRUPTION OF EXTERNAL SURGICAL WOUND, SUBSEQUENT ENCOUNTER: ICD-10-CM

## 2019-10-21 DIAGNOSIS — L01.00 IMPETIGO: ICD-10-CM

## 2019-10-21 DIAGNOSIS — L03.211 CELLULITIS, FACE: ICD-10-CM

## 2019-10-21 DIAGNOSIS — S01.00XD OPEN WOUND OF SCALP, UNSPECIFIED OPEN WOUND TYPE, SUBSEQUENT ENCOUNTER: Primary | ICD-10-CM

## 2019-10-21 PROCEDURE — 1101F PR PT FALLS ASSESS DOC 0-1 FALLS W/OUT INJ PAST YR: ICD-10-PCS | Mod: CPTII,S$GLB,, | Performed by: NURSE PRACTITIONER

## 2019-10-21 PROCEDURE — 3078F PR MOST RECENT DIASTOLIC BLOOD PRESSURE < 80 MM HG: ICD-10-PCS | Mod: CPTII,S$GLB,, | Performed by: REGISTERED NURSE

## 2019-10-21 PROCEDURE — 3074F PR MOST RECENT SYSTOLIC BLOOD PRESSURE < 130 MM HG: ICD-10-PCS | Mod: CPTII,S$GLB,, | Performed by: REGISTERED NURSE

## 2019-10-21 PROCEDURE — 1101F PR PT FALLS ASSESS DOC 0-1 FALLS W/OUT INJ PAST YR: ICD-10-PCS | Mod: CPTII,S$GLB,, | Performed by: REGISTERED NURSE

## 2019-10-21 PROCEDURE — 3074F SYST BP LT 130 MM HG: CPT | Mod: CPTII,S$GLB,, | Performed by: NURSE PRACTITIONER

## 2019-10-21 PROCEDURE — 96372 THER/PROPH/DIAG INJ SC/IM: CPT | Mod: S$GLB,,, | Performed by: NURSE PRACTITIONER

## 2019-10-21 PROCEDURE — 3074F SYST BP LT 130 MM HG: CPT | Mod: CPTII,S$GLB,, | Performed by: REGISTERED NURSE

## 2019-10-21 PROCEDURE — 99213 OFFICE O/P EST LOW 20 MIN: CPT | Mod: 25,S$GLB,, | Performed by: NURSE PRACTITIONER

## 2019-10-21 PROCEDURE — 99213 OFFICE O/P EST LOW 20 MIN: CPT | Mod: S$GLB,,, | Performed by: REGISTERED NURSE

## 2019-10-21 PROCEDURE — 99213 PR OFFICE/OUTPT VISIT, EST, LEVL III, 20-29 MIN: ICD-10-PCS | Mod: S$GLB,,, | Performed by: REGISTERED NURSE

## 2019-10-21 PROCEDURE — 3078F DIAST BP <80 MM HG: CPT | Mod: CPTII,S$GLB,, | Performed by: NURSE PRACTITIONER

## 2019-10-21 PROCEDURE — 3074F PR MOST RECENT SYSTOLIC BLOOD PRESSURE < 130 MM HG: ICD-10-PCS | Mod: CPTII,S$GLB,, | Performed by: NURSE PRACTITIONER

## 2019-10-21 PROCEDURE — 96372 PR INJECTION,THERAP/PROPH/DIAG2ST, IM OR SUBCUT: ICD-10-PCS | Mod: S$GLB,,, | Performed by: NURSE PRACTITIONER

## 2019-10-21 PROCEDURE — 1101F PT FALLS ASSESS-DOCD LE1/YR: CPT | Mod: CPTII,S$GLB,, | Performed by: REGISTERED NURSE

## 2019-10-21 PROCEDURE — 3078F PR MOST RECENT DIASTOLIC BLOOD PRESSURE < 80 MM HG: ICD-10-PCS | Mod: CPTII,S$GLB,, | Performed by: NURSE PRACTITIONER

## 2019-10-21 PROCEDURE — 1101F PT FALLS ASSESS-DOCD LE1/YR: CPT | Mod: CPTII,S$GLB,, | Performed by: NURSE PRACTITIONER

## 2019-10-21 PROCEDURE — 99213 PR OFFICE/OUTPT VISIT, EST, LEVL III, 20-29 MIN: ICD-10-PCS | Mod: 25,S$GLB,, | Performed by: NURSE PRACTITIONER

## 2019-10-21 PROCEDURE — 3078F DIAST BP <80 MM HG: CPT | Mod: CPTII,S$GLB,, | Performed by: REGISTERED NURSE

## 2019-10-21 RX ORDER — BETAMETHASONE SODIUM PHOSPHATE AND BETAMETHASONE ACETATE 3; 3 MG/ML; MG/ML
9 INJECTION, SUSPENSION INTRA-ARTICULAR; INTRALESIONAL; INTRAMUSCULAR; SOFT TISSUE ONCE
Status: COMPLETED | OUTPATIENT
Start: 2019-10-21 | End: 2019-10-21

## 2019-10-21 RX ORDER — SULFAMETHOXAZOLE AND TRIMETHOPRIM 800; 160 MG/1; MG/1
1 TABLET ORAL 2 TIMES DAILY
COMMUNITY

## 2019-10-21 RX ORDER — VALACYCLOVIR HYDROCHLORIDE 1 G/1
1000 TABLET, FILM COATED ORAL EVERY 12 HOURS
Qty: 14 TABLET | Refills: 0 | Status: SHIPPED | OUTPATIENT
Start: 2019-10-21

## 2019-10-21 RX ORDER — CEPHALEXIN 500 MG/1
500 CAPSULE ORAL 4 TIMES DAILY
Qty: 28 CAPSULE | Refills: 0 | Status: SHIPPED | OUTPATIENT
Start: 2019-10-21

## 2019-10-21 RX ADMIN — BETAMETHASONE SODIUM PHOSPHATE AND BETAMETHASONE ACETATE 9 MG: 3; 3 INJECTION, SUSPENSION INTRA-ARTICULAR; INTRALESIONAL; INTRAMUSCULAR; SOFT TISSUE at 09:10

## 2019-10-21 NOTE — PROGRESS NOTES
Subjective:       Patient ID: Marian Glass is a 74 y.o. female.    Chief Complaint: Facial Swelling (Pt stated that Sunday morning she woke up with swelling and redness in her right eye. Pt also has redness and swelling over her right cheek and right side of her nose. Pt has not eaten any new foods, not using any new detergents or soap.)    HPI    Sunday morning she woke up with blistering lesions on her face. She started swelling and redness in her right eye. Pt also has redness and swelling over her right cheek and right side of her nose. Pt has not eaten any new foods, not using any new detergents or soap.     Review of Systems   Constitutional: Negative for activity change, appetite change, chills and fever.   HENT: Negative for congestion, hearing loss, nosebleeds, postnasal drip, sinus pressure, sore throat and trouble swallowing.    Respiratory: Negative for cough, chest tightness, shortness of breath and wheezing.    Cardiovascular: Negative for chest pain and palpitations.   Gastrointestinal: Negative for abdominal distention, abdominal pain, constipation, diarrhea, nausea and vomiting.   Genitourinary: Negative for difficulty urinating, flank pain, frequency, hematuria and urgency.   Musculoskeletal: Negative for arthralgias, back pain, joint swelling, neck pain and neck stiffness.   Skin: Positive for rash and wound. Negative for color change and pallor.   Neurological: Negative for dizziness, seizures, syncope, weakness, light-headedness and headaches.   Hematological: Negative for adenopathy. Does not bruise/bleed easily.   Psychiatric/Behavioral: Negative for agitation, behavioral problems, confusion and sleep disturbance. The patient is not nervous/anxious.        Objective:      Physical Exam   Constitutional: She is oriented to person, place, and time. She appears well-developed and well-nourished.   HENT:   Head: Normocephalic.   Mouth/Throat: Oropharynx is clear and moist.   Scalp dressing of post  scalp wound   Eyes: Pupils are equal, round, and reactive to light. Conjunctivae and EOM are normal. No scleral icterus.   Neck: Trachea normal and normal range of motion. Neck supple. Carotid bruit is not present. No thyroid mass present.   Cardiovascular: Normal rate, regular rhythm and normal heart sounds.   Pulmonary/Chest: Effort normal and breath sounds normal.   Abdominal: Soft. Bowel sounds are normal.   Musculoskeletal: Normal range of motion. She exhibits no edema.   Neurological: She is alert and oriented to person, place, and time.   Skin: Skin is warm and dry. Rash noted. There is erythema.   Lesion on right side on nose trailing down to right fold of mouth. Lesions crusted, honey-colored w/ surrounding redness/swelling/heat spreading up right side of face toward cheek.    Psychiatric: She has a normal mood and affect. Her behavior is normal. Judgment normal.       Assessment:       1. 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 Impetigo    3. Cellulitis, face    4. Disruption of external surgical wound, subsequent encounter        Plan:       PROBLEM LIST     Marian was seen today for facial swelling.    Diagnoses and all orders for this visit:    Herpes zoster virus infection of face and ear nerves  -     valACYclovir (VALTREX) 1000 MG tablet; Take 1 tablet (1,000 mg total) by mouth every 12 (twelve) hours.    Impetigo  Comments:  secondary infection  Orders:  -     cephALEXin (KEFLEX) 500 MG capsule; Take 1 capsule (500 mg total) by mouth 4 (four) times daily.  -     betamethasone acetate-betamethasone sodium phosphate injection 9 mg    Cellulitis, face    Disruption of external surgical wound, subsequent encounter  Comments:  in wound care    Starting anitviral and Keflex. Take all medication until completed. RTC next week. Sooner if redness/swelling worsens. Keep wound care appt for scalp wound on Wed.